# Patient Record
Sex: MALE | URBAN - NONMETROPOLITAN AREA
[De-identification: names, ages, dates, MRNs, and addresses within clinical notes are randomized per-mention and may not be internally consistent; named-entity substitution may affect disease eponyms.]

---

## 2024-06-19 ENCOUNTER — OFFICE (OUTPATIENT)
Dept: URBAN - NONMETROPOLITAN AREA CLINIC 1 | Facility: CLINIC | Age: 54
End: 2024-06-19

## 2024-06-19 VITALS
SYSTOLIC BLOOD PRESSURE: 135 MMHG | DIASTOLIC BLOOD PRESSURE: 76 MMHG | HEIGHT: 67 IN | HEART RATE: 67 BPM | WEIGHT: 307 LBS

## 2024-06-19 DIAGNOSIS — I10 ESSENTIAL (PRIMARY) HYPERTENSION: ICD-10-CM

## 2024-06-19 DIAGNOSIS — Z12.11 ENCOUNTER FOR SCREENING FOR MALIGNANT NEOPLASM OF COLON: ICD-10-CM

## 2024-06-19 DIAGNOSIS — K21.9 GASTRO-ESOPHAGEAL REFLUX DISEASE WITHOUT ESOPHAGITIS: ICD-10-CM

## 2024-06-19 NOTE — SERVICEHPINOTES
Patient with a history of GERD, HTN, history of seizures presents to the clinic today with his wife for colonoscopy consult. He has never had a colonoscopy in the past.  Denies hematochezia, melena, abdominal pain, n/v/d, unintentional weight loss, fever, or change BM.  He reports a good appetite and has 2 BM daily.  Has chronic GERD managed well with panto 40mg po qd.  He has been taking ppi for 1 year.  Denies hematemesis or dysphagia.  Denies a family history of crc.  Denies cardiac stents, anticoagulation therapy, ckd, or supplemental oxygenation use.

## 2024-06-19 NOTE — SERVICENOTES
Risks of procedure explained to patient and he wishes to proceed
Bowel prep prescribed and instructions given to patient
Screening colonoscopy for crc for age
Increase fiber and water intake
Avoid NSAIDs
Continue current ppi at lowest effective dose for gerd- he declines EGD at this time and will call back if needing egd at time of colonoscopy
HTN-pcp managing